# Patient Record
Sex: FEMALE | Race: BLACK OR AFRICAN AMERICAN
[De-identification: names, ages, dates, MRNs, and addresses within clinical notes are randomized per-mention and may not be internally consistent; named-entity substitution may affect disease eponyms.]

---

## 2019-10-17 ENCOUNTER — HOSPITAL ENCOUNTER (OUTPATIENT)
Dept: HOSPITAL 35 - PAIN | Age: 54
Discharge: HOME | End: 2019-10-17
Attending: ANESTHESIOLOGY
Payer: COMMERCIAL

## 2019-10-17 VITALS — WEIGHT: 246 LBS | BODY MASS INDEX: 44.7 KG/M2 | HEIGHT: 62.01 IN

## 2019-10-17 VITALS — SYSTOLIC BLOOD PRESSURE: 118 MMHG | DIASTOLIC BLOOD PRESSURE: 78 MMHG

## 2019-10-17 DIAGNOSIS — F41.9: ICD-10-CM

## 2019-10-17 DIAGNOSIS — Z79.899: ICD-10-CM

## 2019-10-17 DIAGNOSIS — I10: ICD-10-CM

## 2019-10-17 DIAGNOSIS — Z98.890: ICD-10-CM

## 2019-10-17 DIAGNOSIS — I25.10: ICD-10-CM

## 2019-10-17 DIAGNOSIS — R51: ICD-10-CM

## 2019-10-17 DIAGNOSIS — M25.511: Primary | ICD-10-CM

## 2019-10-17 DIAGNOSIS — Z86.73: ICD-10-CM

## 2019-10-17 DIAGNOSIS — Z79.82: ICD-10-CM

## 2019-10-17 DIAGNOSIS — M54.16: ICD-10-CM

## 2019-10-17 NOTE — HPC
[facility]
[address]
[place]   [address]                     PAIN MANAGEMENT CONSULTATION  
_______________________________________________________________________________
 
Name:       MOLLY ZAYAS            Room #:                     REG EDINSON 
ARNULFO.#:      [id number]                       Account #:      [id number]  
Admission:  10/17/19    Attend Phys:    Mayo Molina MD 
Discharge:              Date of Birth:  [birth year]  
                                                          Report #: [id number]
                                                                    [id number]   
_______________________________________________________________________________
THIS REPORT FOR:   //name//                          
 
CC: Easton Elkins DO
    Mayo Molina
 
DATE OF SERVICE:  10/17/2019
 
 
CHIEF COMPLAINT:  Right shoulder pain and low back pain radiating into the left
leg.
 
HISTORY OF PRESENT ILLNESS:  The patient is a pool 54-year-old, who I have
seen previously at [facility].  She is a longstanding patient of Dr. Easton Elkins. 
She presents today with increasing pain in her right shoulder and also recurrent
pain in her low back that radiates down into her leg.
 
The pain in her shoulder is bothersome, particularly given the fact that she has
had a previous CVA.  Use of her upper extremity is somewhat limited.  She finds
that any time she uses her shoulder with internal and external rotation,
lifting, or carrying that she has sudden onset of severe pains that radiate from
her shoulder down into her arm.  Although there may be some description of
radicular component, most of her pain emanates from the shoulder and is
associated with a localized tenderness.
 
Her second pain today is in her low back and it radiates down into her left leg.
 It is worse with standing and weightbearing.  It is also associated with
features of radiculopathy, particularly straight leg raising discomfort.  She
has ongoing limitations in her mobility related to her prior stroke.
 
MEDICATIONS:  Fluticasone, Biofreeze, Voltaren gel, meloxicam 15 mg daily,
Zipsor, tizanidine, Topamax, Symbicort, insulin, aspirin, Plavix, carvedilol,
clonidine, Keppra XR, atorvastatin, Cymbalta, bumetanide, insulin, NovoLog
FlexPen 4 times daily, Lyrica, losartan, lorazepam 1 mg b.i.d. for muscle spasm
and anxiety, Aspercreme, Xopenex, and metoprolol.
 
ALLERGIES:  None listed.
 
PAST MEDICAL HISTORY:  Significant for CVA, several TIAs, ruptured cerebral
aneurysm, seizures, chronic headaches, numbness, tingling, memory loss,
hypertension, and coronary artery disease.
 
REVIEW OF SYSTEMS:  Positive for loss of appetite, change in bowels with
constipation, fatigue, weakness, occasional night sweats, asthma, confusion and
insomnia, and memory loss.
 
 
 
 
[facility]
[address] [place]   [address]                     PAIN MANAGEMENT CONSULTATION  
_______________________________________________________________________________
 
Name:       MOLLY ZAYAS            Room #:                     REG [facility].#:      [id number]                       Account #:      [id number]  
Admission:  10/17/19    Attend Phys:    Mayo Molina MD 
Discharge:              Date of Birth:  [birth year]  
                                                          Report #: [id number]
                                                                    [id number]   
_______________________________________________________________________________
PHYSICAL EXAMINATION:
GENERAL:  This is a pleasant 54-year-old with mild dysarthria.  She speaks
slowly and with intent.
VITAL SIGNS:  As noted above.  She moves independently from sitting to standing
position, but her gait is unstable.  She requires a cane.  She has a fall risk.
HEENT:  Reveals pupils to be equal, round, and reactive to light.  EOMs are
intact.  Mucous membranes are moist.
NECK:  Supple.  She has a midline scar from previous craniectomy and tenderness
along the scar.  She has weakness on the right from her CVA.  She is unable to
lift her left arm, complaining bitterly of pain with abduction and internal and
external rotation.  Tenderness is located anteriorly around the shoulder joint. 
The low back is tender as well.  She has limited range of motion of the low back
with flexion and extension and straight leg raising is positive on the left,
reproducing pain through the left hip, leg, and anterior thigh in the L3-L4
distribution and into the foot.
 
MRI review from 2019, September, shows that there is a patent central canal and
moderate facet degeneration without significant neuroforaminal stenosis at
L5-S1.  At L4-L5, however, there is mild neuroforaminal stenosis on the left and
also some on the right.  Rest of the exam is otherwise unremarkable.
 
It appears that the left disk protrusion is more eccentric than contacting the
L4 nerve on the left, to a greater degree contributing to her left lumbar
radiculopathy.  An MRI of her shoulder is not present, but I would not surprise
if she has evidence of degenerative disease there as well with her arthropathy.
 
RECOMMENDATIONS:
1.  She cannot have an epidural today as she had requested because she remains
on her blood thinner, 1 week off, it will be required.  She will need to discuss
this with Dr. Elkins to see whether she should go on to [facility] for a prevention.
 This is a hassle, but may be the safest approach.
2.  I am going to provide her with a shoulder injection today under fluoroscopic
guidance.  The potential risks and benefits have been reviewed and discussed.  I
think we can do it safely with this 25-gauge needle.
 
PROCEDURE:  Right shoulder injection.
 
DESCRIPTION OF PROCEDURE:  After informed consent, she was taken to the
fluoroscopic suite and placed in a supine position.  Skin was prepped with
ChloraPrep.  A 25-gauge needle was gently advanced into the right shoulder
joint.  Positioning was minimal.  A 0.25 mL of Omnipaque was injected. 
Excellent arthrogram was achieved.  This was then followed by 4 mL of 0.5%
bupivacaine mixed with 40 mg of triamcinolone.
 
She tolerated the procedure well.  No medications were ordered.  Followup visit
 
 
 
[facility]
[address]
[place]   [address]                     PAIN MANAGEMENT CONSULTATION  
_______________________________________________________________________________
 
Name:       MOLLY ZAYAS            Room #:                     REG CL 
LILIANA.#:      [id number]                       Account #:      [id number]  
Admission:  10/17/19    Attend Phys:    Mayo Molina MD 
Discharge:              Date of Birth:  [birth year]  
                                                          Report #: [id number]
                                                                    [id number]   
_______________________________________________________________________________
for a possible lumbar epidural steroid injection after she has safely off Plavix
for 7 days.
 
 
 
 
 
 
 
 
 
 
 
 
 
 
 
 
 
 
 
 
 
 
 
 
 
 
 
 
 
 
 
 
 
 
 
 
 
 
 
 
 
 
                         
   By:                               
                   
D: 10/17/19 1631                           _____________________________________
T: 10/18/19 0533                           Mayo Molina MD           /nt

## 2019-10-28 ENCOUNTER — HOSPITAL ENCOUNTER (OUTPATIENT)
Dept: HOSPITAL 35 - PAIN | Age: 54
Discharge: HOME | End: 2019-10-28
Attending: ANESTHESIOLOGY
Payer: COMMERCIAL

## 2019-10-28 VITALS — HEIGHT: 62.01 IN | WEIGHT: 246 LBS | BODY MASS INDEX: 44.7 KG/M2

## 2019-10-28 VITALS — SYSTOLIC BLOOD PRESSURE: 199 MMHG | DIASTOLIC BLOOD PRESSURE: 113 MMHG

## 2019-10-28 DIAGNOSIS — M99.73: ICD-10-CM

## 2019-10-28 DIAGNOSIS — Z79.899: ICD-10-CM

## 2019-10-28 DIAGNOSIS — M54.16: Primary | ICD-10-CM

## 2019-10-28 DIAGNOSIS — Z79.82: ICD-10-CM

## 2019-10-28 NOTE — HPC
[facility] [place] [place]
[address]
[place]   [address]                     PAIN MANAGEMENT CONSULTATION  
_______________________________________________________________________________
 
Name:       MOLLY ZAYAS            Room #:                     REG ALBERT FORD.#:      [id number]                       Account #:      [id number]  
Admission:  10/28/19    Attend Phys:    Mayo Molina MD 
Discharge:              Date of Birth:  [birth year]  
                                                          Report #: [id number]
                                                                    [id number]   
_______________________________________________________________________________
THIS REPORT FOR:   //name//                          
 
CC: Easotn Elkins DO
    Mayo Molina
 
DATE OF SERVICE:  10/28/2019
 
 
Followup visit for lumbar radiculopathy.
 
The patient returns to clinic today for an epidural injection.  She has been off
of her Plavix for 7 days.  She complains of severe pain in her low back
radiating down into her left leg.  It follows an L5-S1 distribution.
 
She was treated for right shoulder pain and arthropathy on 10/17/2019 with an
injection of 40 mg of triamcinolone.  She responded very favorably and the pain
is no longer troubling her and her shoulder.  Her pain currently is in her low
back and in her left leg.  She describes with some clarity pain that begins in
her back, carries through hip down the anterior lateral thigh and into the calf.
 This follows L5 and somewhat of an L4 distribution.
 
She has had a previous CVA and is spastic and somewhat hemiparetic.
 
PHYSICAL EXAMINATION:
VITAL SIGNS:  She presented with elevated blood pressure.  Blood pressure was
199/110 on admission, which we felt was secondary to pain.  Heart rate 97,
respirations 16.
HEENT:  She has slurred speech.  Pupils are equal, round, reactive to light. 
EOMs are intact.
CHEST:  Clear.
CARDIAC:  Rhythm regular.
  She has difficulty with movements from sitting to standing position, has an
antalgic and unsteady gait.  She has tenderness across her lumbosacral segment
and pain with forward flexion and external rotation and lateral tilt.  Straight
leg raising is positive, radiating pain into the left leg.  Weakness is noted
bilaterally with hip flexion, leg extension, plantar and dorsiflexion.
 
IMPRESSION:  Low back pain with radiculopathy, left L4 through S1 distribution. 
Neural foraminal stenosis, bilateral L4-L5.
 
PROCEDURE:  Lumbar epidural steroid injection L4-L5 under fluoroscopic guidance.
 
She was taken to fluoroscopic suite for treatment, placed prone, skin prepped
with ChloraPrep.  Skin anesthetized over the L4-L5 interspace.  A 20-gauge Tuohy
epidural needle was advanced in the epidural space with loss of resistance
 
 
 
[address]   [address]                     PAIN MANAGEMENT CONSULTATION  
_______________________________________________________________________________
 
Name:       MOLLY ZAYAS            Room #:                     REG ALBERT QUINTEROS#:      [id number]                       Account #:      [id number]  
Admission:  10/28/19    Attend Phys:    Mayo Molina MD 
Discharge:              Date of Birth:  [birth year]  
                                                          Report #: [id number]
                                                                    [id number]   
_______________________________________________________________________________
technique.  There was no blood or CSF aspirated.  A 1 mL of Omnipaque injected. 
Good spread of dye observed in the epidural space followed by 3 mL of 0.5%
lidocaine with 80 mg of triamcinolone.  She tolerated the procedure well and was
taken to recovery room for observation.  During her time in recovery room, her
blood pressure did not go down despite the injection and her blood pressures
were in the range of 210-230 systolic with the highest diastolic blood pressure
130.  I placed a call to Dr. Elkins.  He recommended that we treat her locally
with clonidine and she was given 2 doses of clonidine 0.1 mg p.o. and remained
in the recovery room for another hour and a half.  At the time of discharge, her
blood pressure had come down nicely to 159/101.
 
I discussed clonidine with her at some length before her discharge, expressing
my concern about her blood pressure and her need to follow up closely.  We
discussed the rebound hypertension that occurs if clonidine is not taken on a
scheduled and on a regular basis.  Compliance is essential.  Her daughter was
there as well and we stressed this to her daughter who reports that she will
oversee and make sure that she takes her medication with the use of a pill box.
 
Followup visit planned on an as needed basis for her chronic pain issues.
 
 
 
 
 
 
 
 
 
 
 
 
 
 
 
 
 
 
 
 
 
 
 
 
 
                         
   By:                               
                   
D: 10/28/19 1619                           _____________________________________
T: 10/29/19 0206                           Mayo Molina MD           /nt

## 2019-10-28 NOTE — NUR
Pain Clinic Assessment:
 
1. History of Osteoarthritis:
UNKNOWN
   History of Rheumatoid Arthritis:
NO
 
2. Height: 5 ft. 2 in. 157.5 cm.
   Weight: 246.0 lb.  oz. 111.585 kg.
   Patient's BMI: 45.0
 
3. Vital Signs:
   BP: 199/113 Pulse: 97 Resp: 16
   Temp:  02 Sat: 97 ECG Mon:
 
4. Pain Intensity: 10
 
5. Fall Risk:
   Dizziness: N  Needs help standing or walking: N
   Fallen in the last 3 months: N
   Fall risk comments:
 
 
6. Patient on Blood Thinner: Clopidogrel Bisulf(Plavix
 
7. History of Hypertension: Y
 
8. Opioid Therapy greater than 6 weeks:
   Opiate Contract Signed:
 
9. Risk Assessment Tool Provided:
 
10. Functional Assessment Tool: 66/70
 
11. Recreational Drug Use: Unknown Drug Type:
    Tobacco Use: Never Smoker Tobacco Type:
       Amount or Packs/day:  How Many Years:
    Alcohol Use: No  Frequency:  Quant:

## 2020-05-29 ENCOUNTER — HOSPITAL ENCOUNTER (OUTPATIENT)
Dept: HOSPITAL 35 - SJCVCIMAG | Age: 55
End: 2020-05-29
Attending: PODIATRIST
Payer: COMMERCIAL

## 2020-05-29 DIAGNOSIS — I10: ICD-10-CM

## 2020-05-29 DIAGNOSIS — E11.9: ICD-10-CM

## 2020-05-29 DIAGNOSIS — E78.00: ICD-10-CM

## 2020-05-29 DIAGNOSIS — M79.602: ICD-10-CM

## 2020-05-29 DIAGNOSIS — I70.202: Primary | ICD-10-CM

## 2020-06-29 ENCOUNTER — HOSPITAL ENCOUNTER (OUTPATIENT)
Dept: HOSPITAL 35 - CATH | Age: 55
Discharge: HOME | End: 2020-06-29
Attending: RADIOLOGY
Payer: COMMERCIAL

## 2020-06-29 VITALS — WEIGHT: 247 LBS | BODY MASS INDEX: 45.45 KG/M2 | HEIGHT: 62 IN

## 2020-06-29 VITALS — SYSTOLIC BLOOD PRESSURE: 164 MMHG | DIASTOLIC BLOOD PRESSURE: 98 MMHG

## 2020-06-29 DIAGNOSIS — E11.9: ICD-10-CM

## 2020-06-29 DIAGNOSIS — I70.1: ICD-10-CM

## 2020-06-29 DIAGNOSIS — Z79.899: ICD-10-CM

## 2020-06-29 DIAGNOSIS — Z98.890: ICD-10-CM

## 2020-06-29 DIAGNOSIS — I70.213: Primary | ICD-10-CM

## 2020-06-29 DIAGNOSIS — Z79.4: ICD-10-CM

## 2020-06-29 DIAGNOSIS — Z98.51: ICD-10-CM

## 2020-06-29 DIAGNOSIS — I10: ICD-10-CM

## 2020-06-29 LAB
ANION GAP SERPL CALC-SCNC: 8 MMOL/L (ref 7–16)
BUN SERPL-MCNC: 15 MG/DL (ref 7–18)
CALCIUM SERPL-MCNC: 8.4 MG/DL (ref 8.5–10.1)
CHLORIDE SERPL-SCNC: 100 MMOL/L (ref 98–107)
CO2 SERPL-SCNC: 27 MMOL/L (ref 21–32)
CREAT SERPL-MCNC: 1.1 MG/DL (ref 0.6–1)
ERYTHROCYTE [DISTWIDTH] IN BLOOD BY AUTOMATED COUNT: 13.4 % (ref 10.5–14.5)
GLUCOSE SERPL-MCNC: 341 MG/DL (ref 74–106)
HCT VFR BLD CALC: 43.2 % (ref 37–47)
HGB BLD-MCNC: 15 GM/DL (ref 12–15)
MCH RBC QN AUTO: 30.8 PG (ref 26–34)
MCHC RBC AUTO-ENTMCNC: 34.7 G/DL (ref 28–37)
MCV RBC: 88.5 FL (ref 80–100)
PLATELET # BLD: 200 THOU/UL (ref 150–400)
POTASSIUM SERPL-SCNC: 3.5 MMOL/L (ref 3.5–5.1)
RBC # BLD AUTO: 4.88 MIL/UL (ref 4.2–5)
SODIUM SERPL-SCNC: 135 MMOL/L (ref 136–145)
WBC # BLD AUTO: 6.2 THOU/UL (ref 4–11)

## 2020-09-14 ENCOUNTER — HOSPITAL ENCOUNTER (OUTPATIENT)
Dept: HOSPITAL 35 - PAIN | Age: 55
End: 2020-09-14
Attending: ANESTHESIOLOGY
Payer: COMMERCIAL

## 2020-09-14 VITALS — HEIGHT: 62.01 IN | WEIGHT: 242 LBS | BODY MASS INDEX: 43.97 KG/M2

## 2020-09-14 VITALS — SYSTOLIC BLOOD PRESSURE: 116 MMHG | DIASTOLIC BLOOD PRESSURE: 75 MMHG

## 2020-09-14 DIAGNOSIS — I25.10: ICD-10-CM

## 2020-09-14 DIAGNOSIS — Z79.899: ICD-10-CM

## 2020-09-14 DIAGNOSIS — Z88.8: ICD-10-CM

## 2020-09-14 DIAGNOSIS — M54.2: Primary | ICD-10-CM

## 2020-09-14 DIAGNOSIS — G62.9: ICD-10-CM

## 2020-09-14 DIAGNOSIS — G89.29: ICD-10-CM

## 2020-09-14 DIAGNOSIS — I10: ICD-10-CM

## 2020-09-14 DIAGNOSIS — E66.01: ICD-10-CM

## 2020-09-14 DIAGNOSIS — M54.12: ICD-10-CM

## 2020-09-14 NOTE — HPC
[facility]
[address]
[place]   [address]                     PAIN MANAGEMENT CONSULTATION  
_______________________________________________________________________________
 
Name:       MOLLY ZAYAS            Room #:                     REG ALBERT MATUTE.ROSALIE.#:      [id number]                       Account #:      [id number]  
Admission:  09/14/20    Attend Phys:    Mayo Molina MD 
Discharge:              Date of Birth:  [birth year]  
                                                          Report #: [id number]
                                                                    [id number]   
_______________________________________________________________________________
THIS REPORT FOR:  
 
cc:  Easton Elkins Donald L. DO Morgan, Richard L. MD                                         ~
CC: Easton Kearns
 
DATE OF SERVICE:  09/14/2020
 
 
CHIEF COMPLAINT:  Cervical pain with radiation into the right arm, low back pain
with diffuse radiations across the lumbosacral segment, worse on the right than
the left.
 
So she is here today at the request of Dr. Easton Elkins for a cervical epidural
injection.  She complains of severe pain radiating into the right arm.  Pain is
intense.  She scores it as a 10/10.  She has other pain generators including
pain in the low back that radiates in a nondermatomal pattern along the right
flank.  She also has hypersensitivity and tenderness across the low back.
 
She had a previous CVA and walks with a cane.  She has some neuropathy and
central pain syndrome this is treated by Dr. Elkins with medication.  She is on
Lyrica and has found that to be helpful.  Dr. Elkins has also in the past
provided small doses of hydrocodone as needed and lorazepam.
 
Today, her pain is most severe in the neck and in the right arm.  She scores it
as a 10/10.  This made it difficult for her to use her right arm.  It follows a
C6-C7 distribution radiating into the thumb, index finger and third finger.
 
PQRS REVIEW:  Positive for history of arthritis and arthropathy.  She is
morbidly obese with a BMI of 44.3.  Blood pressure 116/75, heart rate 95,
respirations 16, O2 sat 96.  Marked improvement over her previous visit when her
blood pressure was high enough that we had to give her clonidine in the clinic. 
She scores her pain as a 10/10, but this is fairly typical of the patient.  She
is a fall risk, both for dizziness and she needs help standing or walking.  She
uses a walker.  She is antalgic and ataxic in her motions and walking.  She is
on blood thinner and Plavix, but was discontinued over 7 days ago in
anticipation of an injection.  She will restart it today.  Her hypertension has
been well treated by Dr. Elkins.  I reviewed all medications.  I do not provide
medications for her.  Her functional assessment score is extremely high, 66/70
which suggests that her pain interferes with almost all activities of daily
living.  She denies use of tobacco or alcohol.
 
PHYSICAL EXAMINATION:
 
 
 
[facility]
[address] [place]   [address]                     PAIN MANAGEMENT CONSULTATION  
_______________________________________________________________________________
 
Name:       MOLLY ZAYAS            Room #:                     REG ALBERT QUINTEROS#:      [id number]                       Account #:      [id number]  
Admission:  09/14/20    Attend Phys:    Mayo Molina MD 
Discharge:              Date of Birth:  [birth year]  
                                                          Report #: [id number]
                                                                    [id number]   
_______________________________________________________________________________
VITAL SIGNS:  As noted.
GENERAL:  She has some slurred speech.  EOMs are intact.
CHEST:  Clear to auscultation.
CARDIAC:  Rhythm is regular.
ABDOMEN:  Reveals a large panniculus.
MUSCULOSKELETAL:  Positive for neck pain with neck flexion, extension, rotation,
side-to-side tilt.  Examination of the upper extremities reveals diminished deep
tendon reflexes of the biceps, triceps and brachioradialis.   strengths are
weak, but symmetrical.  She has tenderness all across her low back in a broad
area and also in the right lumbosacral region above the iliac crest and below
the costovertebral angle of the rib cage.  Straight leg raising is positive for
pain into both legs.
 
IMPRESSION:
1.  Cervical radiculopathy on the right C6-C7 distribution.
2.  Chronic low back pain with radiculopathy.
3.  Prior cerebrovascular accident with a history of ruptured cerebral aneurysm.
4.  Seizure disorder.
5.  Chronic headaches.
6.  Neuropathy.
7.  Hypertension, severe.
8.  Coronary artery disease.
9.  Morbid obesity.
 
RECOMMENDATIONS:  I will proceed today with a cervical epidural injection and
get her back on her Plavix as soon as possible.  Potential benefits and risks
have been reviewed with her in some detail.  We will see her back in the future
as necessary for treatment of other pain conditions including her low back pain
with radiculopathy, which she asked about today.
 
PROCEDURE:  Cervical epidural injection C6-C7.
 
After informed consent, she was taken to fluoroscopic suite, placed prone, skin
prepped with ChloraPrep.  Skin anesthetized over C6-C7.  A 20-gauge Tuohy
epidural needle advanced in the first attempt in the epidural space with loss of
resistance technique.  There was no blood or CSF aspirated.  A 1 mL of Omnipaque
injected.  Good spread of dye observed into the epidural space extending
cephalad and caudad.  It was followed by 3 mL of 0.5% lidocaine mixed with 80 mg
triamcinolone.  She tolerated the procedure well without complications.  She was
taken to recovery room for observation and was discharged.
 
Followup visit planned as needed.
 
 
                         
   By:                               
                   
D: 09/14/20 1636                           _____________________________________
T: 09/14/20 2130                           Mayo Molina MD           /nt

## 2020-09-14 NOTE — NUR
Pain Clinic Assessment:
 
1. History of Osteoarthritis:
UNKNOWN
   History of Rheumatoid Arthritis:
NO
 
2. Height: 5 ft. 2 in. 157.5 cm.
   Weight: 242.0 lb.  oz. 109.771 kg.
   Patient's BMI: 44.3
 
3. Vital Signs:
   BP: 116/75 Pulse: 95 Resp: 16
   Temp:  02 Sat: 96 ECG Mon:
 
4. Pain Intensity: 10
 
5. Fall Risk:
   Dizziness: Y  Needs help standing or walking: Y
   Fallen in the last 3 months: N
   Fall risk comments:
 
 
6. Patient on Blood Thinner: Clopidogrel Bisulf(Plavix
 
7. History of Hypertension: Y
 
8. Opioid Therapy greater than 6 weeks:
   Opiate Contract Signed:
 
9. Risk Assessment Tool Provided:
 
10. Functional Assessment Tool: 66/70
 
11. Recreational Drug Use: Never Drug Type:
    Tobacco Use: Never Smoker Tobacco Type:
       Amount or Packs/day:  How Many Years:
    Alcohol Use: No  Frequency:  Quant:

## 2021-12-06 ENCOUNTER — HOSPITAL ENCOUNTER (OUTPATIENT)
Dept: HOSPITAL 35 - PAIN | Age: 56
Discharge: HOME | End: 2021-12-06
Attending: ANESTHESIOLOGY
Payer: COMMERCIAL

## 2021-12-06 VITALS — SYSTOLIC BLOOD PRESSURE: 128 MMHG | DIASTOLIC BLOOD PRESSURE: 90 MMHG

## 2021-12-06 VITALS — WEIGHT: 262 LBS | HEIGHT: 62.01 IN | BODY MASS INDEX: 47.6 KG/M2

## 2021-12-06 DIAGNOSIS — I10: ICD-10-CM

## 2021-12-06 DIAGNOSIS — M54.12: Primary | ICD-10-CM

## 2021-12-06 DIAGNOSIS — Z79.899: ICD-10-CM

## 2021-12-06 DIAGNOSIS — Z98.890: ICD-10-CM

## 2021-12-06 DIAGNOSIS — G89.29: ICD-10-CM

## 2021-12-06 DIAGNOSIS — Z79.82: ICD-10-CM

## 2021-12-06 NOTE — NUR
Pain Clinic Assessment:
 
1. History of Osteoarthritis:
UNKNOWN
   History of Rheumatoid Arthritis:
NO
 
2. Height: 5 ft. 2 in. 157.5 cm.
   Weight: 262.0 lb.  oz. 118.843 kg.
   Patient's BMI: 47.9
 
3. Vital Signs:
   BP: 128/90 Pulse: 104 Resp: 20
   Temp:  02 Sat: 98 ECG Mon:
 
4. Pain Intensity: 7
 
5. Fall Risk:
   Dizziness: N  Needs help standing or walking: N
   Fallen in the last 3 months: Y
   Fall risk comments:
 
 
6. Patient on Blood Thinner: Clopidogrel Bisulf(Plavix
 
7. History of Hypertension: Y
 
8. Opioid Therapy greater than 6 weeks:
   Opiate Contract Signed:
 
9. Risk Assessment Tool Provided:
 
10. Functional Assessment Tool: 66/70
 
11. Recreational Drug Use: Never Drug Type:
    Tobacco Use: Never Smoker Tobacco Type:
       Amount or Packs/day:  How Many Years:
    Alcohol Use: No  Frequency:  Quant:

## 2022-02-28 ENCOUNTER — HOSPITAL ENCOUNTER (OUTPATIENT)
Dept: HOSPITAL 35 - PAIN | Age: 57
Discharge: HOME | End: 2022-02-28
Attending: ANESTHESIOLOGY
Payer: COMMERCIAL

## 2022-02-28 VITALS — HEIGHT: 62.01 IN | BODY MASS INDEX: 48.08 KG/M2 | WEIGHT: 264.6 LBS

## 2022-02-28 VITALS — SYSTOLIC BLOOD PRESSURE: 141 MMHG | DIASTOLIC BLOOD PRESSURE: 94 MMHG

## 2022-02-28 DIAGNOSIS — Z79.82: ICD-10-CM

## 2022-02-28 DIAGNOSIS — G89.29: ICD-10-CM

## 2022-02-28 DIAGNOSIS — Z79.899: ICD-10-CM

## 2022-02-28 DIAGNOSIS — M54.16: Primary | ICD-10-CM

## 2022-02-28 DIAGNOSIS — Z98.890: ICD-10-CM

## 2022-02-28 DIAGNOSIS — I10: ICD-10-CM

## 2022-02-28 NOTE — NUR
Pain Clinic Assessment:
 
1. History of Osteoarthritis:
UNKNOWN
   History of Rheumatoid Arthritis:
NO
 
2. Height: 5 ft. 2 in. 157.5 cm.
   Weight: 264.6 lb.  oz. 120.022 kg.
   Patient's BMI: 48.4
 
3. Vital Signs:
   BP: 141/94 Pulse: 81 Resp: 20
   Temp:  02 Sat: 97 ECG Mon:
 
4. Pain Intensity: 9
 
5. Fall Risk:
   Dizziness: N  Needs help standing or walking: N
   Fallen in the last 3 months: N
   Fall risk comments:
 
 
6. Patient on Blood Thinner: Clopidogrel Bisulf(Plavix
 
7. History of Hypertension: Y
 
8. Opioid Therapy greater than 6 weeks:
   Opiate Contract Signed:
 
9. Risk Assessment Tool Provided:
 
10. Functional Assessment Tool: 66/70
 
11. Recreational Drug Use: Never Drug Type:
    Tobacco Use: Never Smoker Tobacco Type:
       Amount or Packs/day:  How Many Years:
    Alcohol Use: No  Frequency:  Quant: